# Patient Record
Sex: FEMALE | Race: WHITE | NOT HISPANIC OR LATINO | Employment: UNEMPLOYED | URBAN - NONMETROPOLITAN AREA
[De-identification: names, ages, dates, MRNs, and addresses within clinical notes are randomized per-mention and may not be internally consistent; named-entity substitution may affect disease eponyms.]

---

## 2017-04-15 DIAGNOSIS — E03.9 ACQUIRED HYPOTHYROIDISM: ICD-10-CM

## 2017-04-17 RX ORDER — LEVOTHYROXINE SODIUM 112 UG/1
TABLET ORAL
Qty: 90 TABLET | Refills: 0 | Status: SHIPPED | OUTPATIENT
Start: 2017-04-17 | End: 2017-11-06 | Stop reason: SDUPTHER

## 2017-11-06 ENCOUNTER — OFFICE VISIT (OUTPATIENT)
Dept: FAMILY MEDICINE CLINIC | Facility: CLINIC | Age: 52
End: 2017-11-06

## 2017-11-06 VITALS
RESPIRATION RATE: 16 BRPM | SYSTOLIC BLOOD PRESSURE: 127 MMHG | HEART RATE: 70 BPM | TEMPERATURE: 97.9 F | WEIGHT: 233 LBS | BODY MASS INDEX: 36.57 KG/M2 | OXYGEN SATURATION: 100 % | DIASTOLIC BLOOD PRESSURE: 79 MMHG | HEIGHT: 67 IN

## 2017-11-06 DIAGNOSIS — G47.33 OSA (OBSTRUCTIVE SLEEP APNEA): ICD-10-CM

## 2017-11-06 DIAGNOSIS — Z00.00 ROUTINE GENERAL MEDICAL EXAMINATION AT A HEALTH CARE FACILITY: ICD-10-CM

## 2017-11-06 DIAGNOSIS — E03.9 ACQUIRED HYPOTHYROIDISM: Primary | ICD-10-CM

## 2017-11-06 DIAGNOSIS — Z23 NEED FOR IMMUNIZATION AGAINST INFLUENZA: ICD-10-CM

## 2017-11-06 DIAGNOSIS — Z12.31 SCREENING MAMMOGRAM, ENCOUNTER FOR: ICD-10-CM

## 2017-11-06 DIAGNOSIS — E55.9 VITAMIN D DEFICIENCY: ICD-10-CM

## 2017-11-06 PROCEDURE — 99214 OFFICE O/P EST MOD 30 MIN: CPT | Performed by: INTERNAL MEDICINE

## 2017-11-06 PROCEDURE — 90686 IIV4 VACC NO PRSV 0.5 ML IM: CPT | Performed by: INTERNAL MEDICINE

## 2017-11-06 PROCEDURE — 90471 IMMUNIZATION ADMIN: CPT | Performed by: INTERNAL MEDICINE

## 2017-11-06 RX ORDER — LEVOTHYROXINE SODIUM 0.12 MG/1
125 TABLET ORAL DAILY
Qty: 90 TABLET | Refills: 3 | Status: SHIPPED | OUTPATIENT
Start: 2017-11-06 | End: 2018-09-26 | Stop reason: SDUPTHER

## 2017-11-06 NOTE — PROGRESS NOTES
"Subjective     Patient ID: Caity Oh is a 52 y.o. female. Patient is here for management of multiple medical problems.     Chief Complaint   Patient presents with   • Hypothyroidism     Initial visit to establish care with Dr. Rodriguez, transfer from Dr. Salazar   • medication refills     patient needs refills on Omeprazole and Levothyroxine, patient has not had labs sind 2016 and would like to get a 30 day supply of Levothyroxine until she gets lab results   • Mammogran order     patient states her mammogran is due, she would like to get an order.     Hypothyroidism   This is a chronic problem. The current episode started more than 1 year ago. The problem occurs constantly. The problem has been unchanged. Pertinent negatives include no abdominal pain, anorexia or chest pain. She has tried nothing for the symptoms. The treatment provided no relief.    out of meds that past week.     pcp dvers. Needs medical management.    The following portions of the patient's history were reviewed and updated as appropriate: allergies, current medications, past family history, past medical history, past social history, past surgical history and problem list.    Review of Systems   Cardiovascular: Negative for chest pain.   Gastrointestinal: Negative for abdominal pain and anorexia.       Current Outpatient Prescriptions:   •  Cholecalciferol (VITAMIN D) 2000 UNITS capsule, Take 1 capsule by mouth daily., Disp: , Rfl:   •  levothyroxine (SYNTHROID, LEVOTHROID) 125 MCG tablet, Take 1 tablet by mouth Daily., Disp: 90 tablet, Rfl: 3  •  omeprazole (PriLOSEC) 40 MG capsule, Take 1 capsule by mouth Daily., Disp: 90 capsule, Rfl: 3    Objective      Blood pressure 127/79, pulse 70, temperature 97.9 °F (36.6 °C), temperature source Temporal Artery , resp. rate 16, height 67\" (170.2 cm), weight 233 lb (106 kg), SpO2 100 %.    Physical Exam     General Appearance:    Alert, cooperative, no distress, appears stated age   Head:    " Normocephalic, without obvious abnormality, atraumatic   Eyes:    PERRL, conjunctiva/corneas clear, EOM's intact   Ears:    Normal TM's and external ear canals, both ears   Nose:   Nares normal, septum midline, mucosa normal, no drainage   or sinus tenderness   Throat:   Lips, mucosa, and tongue normal; teeth and gums normal   Neck:   Supple, symmetrical, trachea midline, no adenopathy;        thyroid:  No enlargement/tenderness/nodules; no carotid    bruit or JVD   Back:     Symmetric, no curvature, ROM normal, no CVA tenderness   Lungs:     Clear to auscultation bilaterally, respirations unlabored   Chest wall:    No tenderness or deformity   Heart:    Regular rate and rhythm, S1 and S2 normal, no murmur,        rub or gallop   Abdomen:     Soft, non-tender, bowel sounds active all four quadrants,     no masses, no organomegaly   Extremities:   Extremities normal, atraumatic, no cyanosis or edema   Pulses:   2+ and symmetric all extremities   Skin:   Skin color, texture, turgor normal, no rashes or lesions   Lymph nodes:   Cervical, supraclavicular, and axillary nodes normal   Neurologic:   CNII-XII intact. Normal strength, sensation and reflexes       throughout      Results for orders placed or performed in visit on 07/06/16   TSH   Result Value Ref Range    TSH 5.364 (H) 0.350 - 5.350 mIU/mL   T4, free   Result Value Ref Range    Free T4 0.95 0.89 - 1.76 ng/dL         Assessment/Plan   Pt with mild venkatesh. cpap not tolerated nasal. Never tried mask.       Caity was seen today for hypothyroidism, medication refills and mammogran order.    Diagnoses and all orders for this visit:    Acquired hypothyroidism  -     Lipid Panel  -     TSH  -     T4, Free  -     Vitamin B12  -     Comprehensive Metabolic Panel  -     CBC & Differential  -     Lipid Panel  -     levothyroxine (SYNTHROID, LEVOTHROID) 125 MCG tablet; Take 1 tablet by mouth Daily.    Vitamin D deficiency  -     Lipid Panel  -     TSH  -     T4, Free  -      Vitamin B12  -     Comprehensive Metabolic Panel  -     CBC & Differential  -     Lipid Panel    JORI (obstructive sleep apnea)  -     Ambulatory Referral to Neurology    Routine general medical examination at a health care facility  -     Ambulatory Referral to Gastroenterology    Screening mammogram, encounter for  -     Mammo Screening Bilateral With CAD; Future      Return in about 4 months (around 3/6/2018).          There are no Patient Instructions on file for this visit.     Artem Rodriguez MD    Assessment/Plan       Set up for screening colonoscopy.      Caity was seen today for hypothyroidism, medication refills and mammogran order.    Diagnoses and all orders for this visit:    Acquired hypothyroidism  -     Lipid Panel  -     TSH  -     T4, Free  -     Vitamin B12  -     Comprehensive Metabolic Panel  -     CBC & Differential  -     Lipid Panel  -     levothyroxine (SYNTHROID, LEVOTHROID) 125 MCG tablet; Take 1 tablet by mouth Daily.    Vitamin D deficiency  -     Lipid Panel  -     TSH  -     T4, Free  -     Vitamin B12  -     Comprehensive Metabolic Panel  -     CBC & Differential  -     Lipid Panel    JORI (obstructive sleep apnea)  -     Ambulatory Referral to Neurology    Routine general medical examination at a health care facility  -     Ambulatory Referral to Gastroenterology    Screening mammogram, encounter for  -     Mammo Screening Bilateral With CAD; Future      Return in about 4 months (around 3/6/2018).                   There are no Patient Instructions on file for this visit.

## 2017-12-06 ENCOUNTER — HOSPITAL ENCOUNTER (OUTPATIENT)
Dept: MAMMOGRAPHY | Facility: HOSPITAL | Age: 52
Discharge: HOME | End: 2017-12-06
Attending: INTERNAL MEDICINE | Admitting: INTERNAL MEDICINE

## 2017-12-06 DIAGNOSIS — Z12.31 SCREENING MAMMOGRAM, ENCOUNTER FOR: ICD-10-CM

## 2017-12-06 PROCEDURE — G0202 SCR MAMMO BI INCL CAD: HCPCS

## 2017-12-06 PROCEDURE — 77063 BREAST TOMOSYNTHESIS BI: CPT

## 2018-02-22 ENCOUNTER — OFFICE VISIT (OUTPATIENT)
Dept: NEUROLOGY | Facility: CLINIC | Age: 53
End: 2018-02-22

## 2018-02-22 VITALS
WEIGHT: 233 LBS | OXYGEN SATURATION: 99 % | DIASTOLIC BLOOD PRESSURE: 80 MMHG | BODY MASS INDEX: 36.57 KG/M2 | HEART RATE: 79 BPM | SYSTOLIC BLOOD PRESSURE: 126 MMHG | HEIGHT: 67 IN

## 2018-02-22 DIAGNOSIS — G47.34 NOCTURNAL OXYGEN DESATURATION: ICD-10-CM

## 2018-02-22 DIAGNOSIS — G25.81 RESTLESS LEGS SYNDROME (RLS): ICD-10-CM

## 2018-02-22 DIAGNOSIS — G47.61 PERIODIC LIMB MOVEMENT DISORDER (PLMD): ICD-10-CM

## 2018-02-22 DIAGNOSIS — G47.19 EXCESSIVE DAYTIME SLEEPINESS: ICD-10-CM

## 2018-02-22 DIAGNOSIS — G47.33 OBSTRUCTIVE SLEEP APNEA: Primary | ICD-10-CM

## 2018-02-22 PROCEDURE — 99244 OFF/OP CNSLTJ NEW/EST MOD 40: CPT | Performed by: PSYCHIATRY & NEUROLOGY

## 2018-02-22 NOTE — PROGRESS NOTES
"NEUROLOGY CONSULTATION   History of Present Illness     Date: 2/22/2018    Patient Identification  Caity Oh is a 52 y.o. female.    Patient information was obtained from patient.  History/Exam limitations: none.  Patient presented to the [facility] by private vehicle.  Referring Provider: Artme Rodriguez MD      Chief Complaint   Memory Loss (NP, in office today for consult. Pt c/o forgetfullness, \"feeling foggoy\", being light sleeper. Pt had sleep study in 2014, dx'ed with \"mild\" JORI. Pt states she rx'ed CPAP machine, was unable to have initial follow up after starting machine )      EPWORTH SLEEPINESS SCALE   6    History of Present Illness:   Pt is a 51 y/o with a PMH significant for hypothyroidism and GERD that presents today for difficulty sleeping. First sleep study was completed in 2014 following presentation to her PCP with difficulty concentrating, memory problems, and increased evening time sleepiness. She was given a CPAP at that time but was not comfortable with the mask. She reports early afternoon sleepiness and multiple episodes of drowsiness over the past two years.     PMH:   Past Medical History:   Diagnosis Date   • Acid reflux disease 9/30/2016   • Arthritis    • Fracture of toe, closed    • Fractures    • Hypothyroidism 7/6/2016   • Knee injury    • Right knee injury    • Sleep apnea        Family Hisotry:   Family History   Problem Relation Age of Onset   • Other Father      malignant neoplasm   • Prostate cancer Father    • Arthritis Sister    • COPD Mother    • Dementia Maternal Grandmother    • Stroke Maternal Grandmother    • Stomach cancer Maternal Grandfather    • Cancer Paternal Grandfather        Social Hisotry:   Social History     Social History   • Marital status:      Spouse name: N/A   • Number of children: N/A   • Years of education: N/A     Occupational History   • Not on file.     Social History Main Topics   • Smoking status: Never " Smoker   • Smokeless tobacco: Never Used   • Alcohol use Yes      Comment: occasional alcohol use   • Drug use: No   • Sexual activity: Not on file     Other Topics Concern   • Not on file     Social History Narrative       Medications:   Current Outpatient Prescriptions   Medication Sig Dispense Refill   • levothyroxine (SYNTHROID, LEVOTHROID) 125 MCG tablet Take 1 tablet by mouth Daily. 90 tablet 3   • omeprazole (PriLOSEC) 40 MG capsule Take 1 capsule by mouth Daily. 90 capsule 3     No current facility-administered medications for this visit.        Allergy: No Known Allergies    Review of Systems:The following portions of the patient's history were reviewed and updated as appropriate: allergies, current medications, past family history, past medical history, past social history, past surgical history and problem list.  Review of Systems   Constitutional: Positive for fatigue. Negative for chills and fever.   HENT: Negative for congestion, ear pain, hearing loss, rhinorrhea and sore throat.    Eyes: Negative for pain, discharge and redness.   Respiratory: Positive for apnea. Negative for cough, shortness of breath, wheezing and stridor.    Cardiovascular: Negative for chest pain, palpitations and leg swelling.   Gastrointestinal: Negative for abdominal pain, constipation, nausea and vomiting.   Endocrine: Negative for cold intolerance, heat intolerance and polyphagia.   Genitourinary: Negative for dysuria, flank pain, frequency and urgency.   Musculoskeletal: Negative for joint swelling, myalgias, neck pain and neck stiffness.   Skin: Negative for pallor, rash and wound.   Allergic/Immunologic: Negative for environmental allergies.   Neurological: Negative for dizziness, tremors, seizures, syncope, facial asymmetry, speech difficulty, weakness, light-headedness, numbness and headaches.   Hematological: Negative for adenopathy.   Psychiatric/Behavioral: Positive for decreased concentration and sleep disturbance.  "Negative for confusion and hallucinations. The patient is not nervous/anxious.          Physical Exam     /80  Pulse 79  Ht 170.2 cm (67\")  Wt 106 kg (233 lb)  SpO2 99%  BMI 36.49 kg/m2  GENERAL: Patient is pleasant, cooperative, appears to be stated age.  Body habitus is endomorphic.  SKIN AND EXTREMITIES:  No skin rashes or lesions are noted.  No cyanosis, clubbing or edema of the extremities.    HEAD:  Head is normocephalic and atraumatic.    NECK: Neck are non-tender without thyromegaly or adenopathy.  Carotic upstrokes are 1+/4.  No cranial or cervical bruits.  The neck is supple with a full range of motion.   ENT: palate elevate symmetrically, no evidence of high arch palate, tongue midline erythema in posterior pharynx, MellamPatti Classification Class III   HEART:  Regular rate and rhythm with normal S1 and S2 without rub or gallop  LUNGS:  Clear to auscultation without wheezes or crackle   ABDOMEN:  Soft and non-tender, positive bowel sound without hepatosplenomegaly  BACK:  Back is straight without midline defect.    MENTAL:  Higher cortical function/mental status:  The patient is alert.  She is oriented x3 to time, place and person.  Recent and the remote memory appear normal.  The patient has a good fund of knowledge.  There is no visual or auditory hallucination or suicidal or homicidal ideation.  SPEECH:There is no gross evidence of aphasia, dysarthria or agnosia.      CRANIAL NERVES:  Pupils are 4mm, equal round reactive to light, reacting briskly to 2mm without afferent pupillary defect.  Visual fields are intact to confrontation testing.  Fundoscopic examination reveals sharp disk margins with normal vasculature.  No papilledema, hemorrhages or exudates.  Extraocular movements are full and smooth with normal pursuits and saccades.  No nystagmus noted.  The face is symmetric. palate elevate symmetrically, Tongue midline, positive gag reflex. The remainder of the cranial nerves are intact " and symmetrical.    MOTOR: Strength is 5/5 throughout with normal tone and bulk with the following exceptions, 4/5 intrinsic muscles of the hands and feet.  No involuntary movements noted.    REFLEX: are 2/4 and symmetrical in the upper extremities, 2/4 and symmetrical at the knees and 1/4 and symmetrical at the Achilles tendon.  Plantar responses were down-going bilaterally.    SENSATION:  Intact to pinprick, light touch, vibration and proprioception.  Coordination:  The patient normally performs finger-nose-finger, heel-to-knee-to-shin and rapid alternating movements in symmetrical fashion.    STATION AND GAIT:  The patient walks with a narrow-based gait.  Patient is able to heel-toe and tandem walk forward and backwards without difficulty.  Romberg and monopedal  Romberg are negative.             Records Reviewed: Old medical records.    Caity was seen today for memory loss.    Diagnoses and all orders for this visit:    Obstructive sleep apnea  -     Polysomnography 4 or More Parameters; Future    Excessive daytime sleepiness  -     Polysomnography 4 or More Parameters; Future    Nocturnal oxygen desaturation  -     Polysomnography 4 or More Parameters; Future    Restless legs syndrome (RLS)    Periodic limb movement disorder (PLMD)        Treatments:  1. Diagnostic sleep study to evaluate for obstructive sleep apnea.    Discussion:  Sleep Apnea  I have counseled patient extensively on Sleep Hygiene including regular sleep wake schedule and stimulus control therapy.  I have also discussed the importance of weight reduction because 10% reduction in body weight can reduce sleep apnea by 50 %. We have also discussed abstaining from smoking and drinking.  I have explained to patient that obstructive apnea episode is defined as the absence of airflow for at least 10 seconds.  Sleep apnea is usually accompanied by snoring, disturbed sleep, and daytime sleepiness. Patients with micrognathia, retrognathia, enlarged  tonsils, tongue enlargement, and acromegaly are especially predisposed to obstructive sleep apnea. Abnormalities or weakness in the muscles can also contribute to obstructive sleep apnea. Obesity can also contribute to sleep apnea.     Sleep apnea can lead to a number of complications, ranging from daytime sleepiness to possible increased risk of cardiovascular risks.   Daytime sleepiness is the most serious.  Daytime sleepiness can also increase the risk for accident-related injuries. Several studies have suggested that people with sleep apnea have two to three times as many car accidents, and five to seven times the risk for multiple accidents.   A number of cardiovascular diseases -- including high blood pressure, heart failure, stroke, and heart arrhythmias -- have an association with obstructive sleep apnea.   Up to a third of patients with heart failure also have sleep apnea. Both central and obstructive sleep apnea are linked with heart failure. Obstructive sleep apnea is also noted to be associated with type 2 diabetes according to Dr. Gomez at [facility].  The best treatment for symptomatic obstructive sleep apnea is continuous positive airflow pressure (CPAP). Bilevel positive airway pressure (BPAP) systems may be particularly helpful for patients with coexisting lung disease and those with excessive levels of carbon dioxide.  Other treatment options including UPPP surgery, LAUP surgery, radiofrequency somnoplasty and dental appliances such Romelia or Clearway.    Restless Legs Syndrome  I have counseled patient extensively on the pathophysiology of Restless Legs Syndrome(RLS).  I have explained to the patient how D3 receptor dysfunction can give rise to RLS.  We have also discussed how iron deficiency can contribute to RLS.   Nevertheless, the first line of defense against restless legs syndrome is to avoid substances or foods that may be causing or worsening the problem such as alcohol, caffeine , and  nicotine. In addition, we have reviewed all medications could exacerbate the problem and checking Ferritin level today.      I have also reviewed with my patient any underlying medical conditions that could give rise to secondary RLS, such as anemia , diabetes , nutritional deficiencies, kidney disease, thyroid  disease, varicose veins, or Parkinson's disease.     We have discussed FDA approved pharmacologic intervention such as dopamine agonists.  These are most often the first line treatment used to treat RLS including Mirapex (pramipexole), Neupro patch (rotigotine), and Requip (ropinirole). I have also counseled patient on side effects of Dopamine agonists include daytime sleepiness, nausea, hypotension and lightheadedness.    We have also discussed the use of non-dopaminergic medication such as Horizant (gabapentin enacarbil), as an adjunct to relieve the symptoms of RLS especially painful RLS symptoms.      This document is signed by Ward Ceo MD, FAAN, FAASM February 22, 2018 1:19 PM

## 2018-04-10 ENCOUNTER — APPOINTMENT (OUTPATIENT)
Dept: SLEEP MEDICINE | Facility: HOSPITAL | Age: 53
End: 2018-04-10
Attending: PSYCHIATRY & NEUROLOGY

## 2018-09-18 ENCOUNTER — OFFICE VISIT (OUTPATIENT)
Dept: INTERNAL MEDICINE | Facility: CLINIC | Age: 53
End: 2018-09-18

## 2018-09-18 ENCOUNTER — TELEPHONE (OUTPATIENT)
Dept: SURGERY | Facility: CLINIC | Age: 53
End: 2018-09-18

## 2018-09-18 VITALS
RESPIRATION RATE: 16 BRPM | HEART RATE: 68 BPM | TEMPERATURE: 98 F | OXYGEN SATURATION: 100 % | HEIGHT: 67 IN | DIASTOLIC BLOOD PRESSURE: 76 MMHG | BODY MASS INDEX: 36.26 KG/M2 | WEIGHT: 231 LBS | SYSTOLIC BLOOD PRESSURE: 130 MMHG

## 2018-09-18 DIAGNOSIS — G89.29 CHRONIC LEFT SHOULDER PAIN: ICD-10-CM

## 2018-09-18 DIAGNOSIS — M19.90 ARTHRITIS: ICD-10-CM

## 2018-09-18 DIAGNOSIS — S76.012A STRAIN OF LEFT PSOAS MUSCLE, INITIAL ENCOUNTER: ICD-10-CM

## 2018-09-18 DIAGNOSIS — M54.50 CHRONIC BILATERAL LOW BACK PAIN WITHOUT SCIATICA: ICD-10-CM

## 2018-09-18 DIAGNOSIS — M25.552 PAIN OF LEFT HIP JOINT: ICD-10-CM

## 2018-09-18 DIAGNOSIS — Z12.11 COLON CANCER SCREENING: Primary | ICD-10-CM

## 2018-09-18 DIAGNOSIS — E03.9 ACQUIRED HYPOTHYROIDISM: ICD-10-CM

## 2018-09-18 DIAGNOSIS — M25.512 CHRONIC LEFT SHOULDER PAIN: ICD-10-CM

## 2018-09-18 DIAGNOSIS — R53.83 FATIGUE, UNSPECIFIED TYPE: ICD-10-CM

## 2018-09-18 DIAGNOSIS — R79.89 LOW VITAMIN D LEVEL: ICD-10-CM

## 2018-09-18 DIAGNOSIS — G89.29 CHRONIC BILATERAL LOW BACK PAIN WITHOUT SCIATICA: ICD-10-CM

## 2018-09-18 PROCEDURE — 99214 OFFICE O/P EST MOD 30 MIN: CPT | Performed by: INTERNAL MEDICINE

## 2018-09-18 NOTE — PROGRESS NOTES
Subjective     Patient ID: Caity Oh is a 53 y.o. female. Patient is here for management of multiple medical problems.     Chief Complaint   Patient presents with   • Hypothyroidism     follow-up, needs lab orders   • Pain     patient having pain in the left hip and left shoulder x 6 months, patient states the pain comes when she moves or walks and it is getting worse    • Medication refills     patient needs refills on Levothyroxine      Hypothyroidism   This is a chronic problem. The current episode started today. The problem has been resolved. Associated symptoms include arthralgias, fatigue and joint swelling. Pertinent negatives include no chest pain or congestion. Nothing aggravates the symptoms. The treatment provided significant relief.   Pain   Associated symptoms include arthralgias, fatigue and joint swelling. Pertinent negatives include no chest pain or congestion.    lives in [place].    No recent tick bites and no known tick bites. Lots of ticks taken off her last year.      Pain in left hip and shoulder x 6 moth. Worse with walking and moving.  Taking advil.    Fell over a year ago.  Sleep on left side and sleeps with hands over head if on back.  Stiff in lower back.    Mentally foggy.            The following portions of the patient's history were reviewed and updated as appropriate: allergies, current medications, past family history, past medical history, past social history, past surgical history and problem list.    Review of Systems   Constitutional: Positive for fatigue.   HENT: Negative for congestion and dental problem.    Respiratory: Negative for shortness of breath and stridor.    Cardiovascular: Negative for chest pain and leg swelling.   Gastrointestinal: Negative for abdominal distention.   Musculoskeletal: Positive for arthralgias, back pain, gait problem and joint swelling.   Psychiatric/Behavioral: Negative for sleep disturbance.   All other systems reviewed and are  "negative.      Current Outpatient Prescriptions:   •  levothyroxine (SYNTHROID, LEVOTHROID) 125 MCG tablet, Take 1 tablet by mouth Daily., Disp: 90 tablet, Rfl: 3  •  omeprazole (PriLOSEC) 40 MG capsule, Take 1 capsule by mouth Daily., Disp: 90 capsule, Rfl: 3    Objective      Blood pressure 130/76, pulse 68, temperature 98 °F (36.7 °C), temperature source Oral, resp. rate 16, height 170.2 cm (67\"), weight 105 kg (231 lb), SpO2 100 %.    Physical Exam     General Appearance:    Alert, cooperative, no distress, appears stated age   Head:    Normocephalic, without obvious abnormality, atraumatic   Eyes:    PERRL, conjunctiva/corneas clear, EOM's intact   Ears:    Normal TM's and external ear canals, both ears   Nose:   Nares normal, septum midline, mucosa normal, no drainage   or sinus tenderness   Throat:   Lips, mucosa, and tongue normal; teeth and gums normal   Neck:   Supple, symmetrical, trachea midline, no adenopathy;        thyroid:  No enlargement/tenderness/nodules; no carotid    bruit or JVD   Back:     Symmetric, no curvature, ROM normal, no CVA tenderness   Lungs:     Clear to auscultation bilaterally, respirations unlabored   Chest wall:    No tenderness or deformity   Heart:    Regular rate and rhythm, S1 and S2 normal, no murmur,        rub or gallop   Abdomen:     Soft, non-tender, bowel sounds active all four quadrants,     no masses, no organomegaly   Extremities:   ttp left deltoid burasa.  Troch left pain ttp. ttp left groind.      Pulses:   2+ and symmetric all extremities   Skin:   Skin color, texture, turgor normal, no rashes or lesions   Lymph nodes:   Cervical, supraclavicular, and axillary nodes normal   Neurologic:   CNII-XII intact. Normal strength, sensation and reflexes       throughout      Results for orders placed or performed in visit on 07/06/16   TSH   Result Value Ref Range    TSH 5.364 (H) 0.350 - 5.350 mIU/mL   T4, free   Result Value Ref Range    Free T4 0.95 0.89 - 1.76 ng/dL "         Assessment/Plan     PT to stretch psoas on left hip.  Pain gel for deltoid bursitis and troch bursitis.  rx sent to [place].           Caity was seen today for hypothyroidism, pain and medication refills.    Diagnoses and all orders for this visit:    Colon cancer screening  -     Ambulatory Referral to General Surgery    Low vitamin D level  -     Vitamin D 25 Hydroxy    Arthritis  -     [place] Spotted Fever, IgM  -     [place] Spotted Fever, IgG  -     Lyme Disease, PCR  -     Ehrlichia Chaffeensis PCR    Fatigue, unspecified type  -     [place] Spotted Fever, IgM  -     [place] Spotted Fever, IgG  -     Lyme Disease, PCR  -     Ehrlichia Chaffeensis PCR  -     Comprehensive Metabolic Panel  -     Vitamin B12  -     CBC & Differential  -     Lipid Panel    Pain of left hip joint  -     [place] Spotted Fever, IgM  -     [place] Spotted Fever, IgG  -     Lyme Disease, PCR  -     Ehrlichia Chaffeensis PCR    Chronic bilateral low back pain without sciatica  -     [place] Spotted Fever, IgM  -     [place] Spotted Fever, IgG  -     Ehrlichia Chaffeensis PCR    Chronic left shoulder pain  -     [place] Spotted Fever, IgM  -     [place] Spotted Fever, IgG  -     Ehrlichia Chaffeensis PCR    Acquired hypothyroidism  -     T4, Free  -     TSH    Strain of left psoas muscle, initial encounter      Return in about 4 weeks (around 10/16/2018).          There are no Patient Instructions on file for this visit.     Artem Rodriguez MD    Assessment/Plan

## 2018-09-19 RX ORDER — BISACODYL 5 MG/1
5 TABLET, DELAYED RELEASE ORAL DAILY
Qty: 4 TABLET | Refills: 0 | Status: SHIPPED | OUTPATIENT
Start: 2018-09-19

## 2018-09-19 RX ORDER — POLYETHYLENE GLYCOL 3350 17 G/17G
238 POWDER, FOR SOLUTION ORAL ONCE
Qty: 14 PACKET | Refills: 0 | Status: SHIPPED | OUTPATIENT
Start: 2018-09-19 | End: 2018-09-19

## 2018-09-19 NOTE — TELEPHONE ENCOUNTER
[place] [facility] [place].   Never had a colonoscopy.  Instructions mailed, prep sent in

## 2018-09-20 ENCOUNTER — PREP FOR SURGERY (OUTPATIENT)
Dept: OTHER | Facility: HOSPITAL | Age: 53
End: 2018-09-20

## 2018-09-20 DIAGNOSIS — Z12.11 ENCOUNTER FOR SCREENING COLONOSCOPY: Primary | ICD-10-CM

## 2018-09-21 ENCOUNTER — TELEPHONE (OUTPATIENT)
Dept: INTERNAL MEDICINE | Facility: CLINIC | Age: 53
End: 2018-09-21

## 2018-09-21 NOTE — TELEPHONE ENCOUNTER
Dr. Rodriguez, patient saw labs in [facility], she needs refills for Thyroid, and she has questions about Vitamin D, she would like to talk to you.

## 2018-09-24 LAB
25(OH)D3+25(OH)D2 SERPL-MCNC: 25.3 NG/ML
ALBUMIN SERPL-MCNC: 4.2 G/DL (ref 3.5–5)
ALBUMIN/GLOB SERPL: 1.6 G/DL (ref 1–2)
ALP SERPL-CCNC: 114 U/L (ref 38–126)
ALT SERPL-CCNC: 42 U/L (ref 13–69)
AST SERPL-CCNC: 28 U/L (ref 15–46)
B BURGDOR DNA SPEC QL NAA+PROBE: NEGATIVE
BASOPHILS # BLD AUTO: 0.05 10*3/MM3 (ref 0–0.2)
BASOPHILS NFR BLD AUTO: 0.9 % (ref 0–2.5)
BILIRUB SERPL-MCNC: 0.5 MG/DL (ref 0.2–1.3)
BUN SERPL-MCNC: 17 MG/DL (ref 7–20)
BUN/CREAT SERPL: 28.3 (ref 7.1–23.5)
CALCIUM SERPL-MCNC: 9.6 MG/DL (ref 8.4–10.2)
CHLORIDE SERPL-SCNC: 107 MMOL/L (ref 98–107)
CHOLEST SERPL-MCNC: 182 MG/DL (ref 0–199)
CO2 SERPL-SCNC: 26 MMOL/L (ref 26–30)
CREAT SERPL-MCNC: 0.6 MG/DL (ref 0.6–1.3)
E CHAFFEENSIS DNA BLD QL NAA+PROBE: NEGATIVE
EOSINOPHIL # BLD AUTO: 0.42 10*3/MM3 (ref 0–0.7)
EOSINOPHIL NFR BLD AUTO: 7.8 % (ref 0–7)
ERYTHROCYTE [DISTWIDTH] IN BLOOD BY AUTOMATED COUNT: 12.3 % (ref 11.5–14.5)
GLOBULIN SER CALC-MCNC: 2.7 GM/DL
GLUCOSE SERPL-MCNC: 101 MG/DL (ref 74–98)
HCT VFR BLD AUTO: 43.4 % (ref 37–47)
HDLC SERPL-MCNC: 76 MG/DL (ref 40–60)
HGB BLD-MCNC: 14.3 G/DL (ref 12–16)
IMM GRANULOCYTES # BLD: 0.01 10*3/MM3 (ref 0–0.06)
IMM GRANULOCYTES NFR BLD: 0.2 % (ref 0–0.6)
LDLC SERPL CALC-MCNC: 82 MG/DL (ref 0–99)
LYMPHOCYTES # BLD AUTO: 1.71 10*3/MM3 (ref 0.6–3.4)
LYMPHOCYTES NFR BLD AUTO: 31.8 % (ref 10–50)
MCH RBC QN AUTO: 29.5 PG (ref 27–31)
MCHC RBC AUTO-ENTMCNC: 32.9 G/DL (ref 30–37)
MCV RBC AUTO: 89.7 FL (ref 81–99)
MONOCYTES # BLD AUTO: 0.41 10*3/MM3 (ref 0–0.9)
MONOCYTES NFR BLD AUTO: 7.6 % (ref 0–12)
NEUTROPHILS # BLD AUTO: 2.78 10*3/MM3 (ref 2–6.9)
NEUTROPHILS NFR BLD AUTO: 51.7 % (ref 37–80)
NRBC BLD AUTO-RTO: 0 /100 WBC (ref 0–0)
PLATELET # BLD AUTO: 280 10*3/MM3 (ref 130–400)
POTASSIUM SERPL-SCNC: 4.4 MMOL/L (ref 3.5–5.1)
PROT SERPL-MCNC: 6.9 G/DL (ref 6.3–8.2)
R RICKETTSI IGG SER QL IA: NEGATIVE
R RICKETTSI IGM SER-ACNC: 0.27 INDEX (ref 0–0.89)
RBC # BLD AUTO: 4.84 10*6/MM3 (ref 4.2–5.4)
SODIUM SERPL-SCNC: 140 MMOL/L (ref 137–145)
T4 FREE SERPL-MCNC: 0.8 NG/DL (ref 0.78–2.19)
TRIGL SERPL-MCNC: 121 MG/DL
TSH SERPL DL<=0.005 MIU/L-ACNC: 0.06 MIU/ML (ref 0.47–4.68)
VIT B12 SERPL-MCNC: 320 PG/ML (ref 239–931)
VLDLC SERPL CALC-MCNC: 24.2 MG/DL
WBC # BLD AUTO: 5.38 10*3/MM3 (ref 4.8–10.8)

## 2018-09-25 ENCOUNTER — PREP FOR SURGERY (OUTPATIENT)
Dept: OTHER | Facility: HOSPITAL | Age: 53
End: 2018-09-25

## 2018-09-25 DIAGNOSIS — Z12.11 ENCOUNTER FOR SCREENING COLONOSCOPY: Primary | ICD-10-CM

## 2018-09-26 DIAGNOSIS — E53.8 VITAMIN B 12 DEFICIENCY: Primary | ICD-10-CM

## 2018-09-26 DIAGNOSIS — M25.552 LEFT HIP PAIN: ICD-10-CM

## 2018-09-26 DIAGNOSIS — E03.9 ACQUIRED HYPOTHYROIDISM: ICD-10-CM

## 2018-09-26 RX ORDER — LEVOTHYROXINE SODIUM 0.12 MG/1
125 TABLET ORAL DAILY
Qty: 90 TABLET | Refills: 3 | Status: SHIPPED | OUTPATIENT
Start: 2018-09-26 | End: 2020-05-22 | Stop reason: SDUPTHER

## 2018-10-12 ENCOUNTER — OFFICE VISIT (OUTPATIENT)
Dept: INTERNAL MEDICINE | Facility: CLINIC | Age: 53
End: 2018-10-12

## 2018-10-12 ENCOUNTER — HOSPITAL ENCOUNTER (OUTPATIENT)
Dept: GENERAL RADIOLOGY | Facility: HOSPITAL | Age: 53
Discharge: HOME | End: 2018-10-12
Admitting: INTERNAL MEDICINE

## 2018-10-12 VITALS
TEMPERATURE: 97.9 F | DIASTOLIC BLOOD PRESSURE: 75 MMHG | RESPIRATION RATE: 16 BRPM | SYSTOLIC BLOOD PRESSURE: 141 MMHG | BODY MASS INDEX: 33.9 KG/M2 | HEIGHT: 67 IN | OXYGEN SATURATION: 98 % | HEART RATE: 70 BPM | WEIGHT: 216 LBS

## 2018-10-12 DIAGNOSIS — M25.552 LEFT HIP PAIN: ICD-10-CM

## 2018-10-12 DIAGNOSIS — M79.671 RIGHT FOOT PAIN: Primary | ICD-10-CM

## 2018-10-12 DIAGNOSIS — M70.62 TROCHANTERIC BURSITIS OF LEFT HIP: ICD-10-CM

## 2018-10-12 DIAGNOSIS — M79.671 RIGHT FOOT PAIN: ICD-10-CM

## 2018-10-12 PROCEDURE — 99213 OFFICE O/P EST LOW 20 MIN: CPT | Performed by: INTERNAL MEDICINE

## 2018-10-12 PROCEDURE — 73502 X-RAY EXAM HIP UNI 2-3 VIEWS: CPT

## 2018-10-12 PROCEDURE — 73630 X-RAY EXAM OF FOOT: CPT

## 2018-10-12 NOTE — PROGRESS NOTES
"Subjective     Patient ID: Caity hO is a 53 y.o. female. Patient is here for management of multiple medical problems.     Chief Complaint   Patient presents with   • Foot Swelling     patient here for swelling on the right top of foot, patient states it aches when sitting, but she has pain when walking x 1 week     History of Present Illness     Was on Fall brake and did a lot of walking last week.   Pain started after getting back for vacation.    Taking advil 3 pill before bed      The following portions of the patient's history were reviewed and updated as appropriate: allergies, current medications, past family history, past medical history, past social history, past surgical history and problem list.    Review of Systems   Constitutional: Negative for fatigue.   Musculoskeletal: Positive for arthralgias, gait problem and joint swelling.   All other systems reviewed and are negative.      Current Outpatient Prescriptions:   •  bisacodyl (bisacodyl) 5 MG EC tablet, Take 1 tablet by mouth Daily., Disp: 4 tablet, Rfl: 0  •  levothyroxine (SYNTHROID, LEVOTHROID) 125 MCG tablet, Take 1 tablet by mouth Daily., Disp: 90 tablet, Rfl: 3  •  omeprazole (PriLOSEC) 40 MG capsule, Take 1 capsule by mouth Daily., Disp: 90 capsule, Rfl: 3    Objective      Blood pressure 141/75, pulse 70, temperature 97.9 °F (36.6 °C), temperature source Oral, resp. rate 16, height 170.2 cm (67\"), weight 98 kg (216 lb), SpO2 98 %.    Physical Exam     General Appearance:    Alert, cooperative, no distress, appears stated age   Head:    Normocephalic, without obvious abnormality, atraumatic   Eyes:    PERRL, conjunctiva/corneas clear, EOM's intact   Ears:    Normal TM's and external ear canals, both ears   Nose:   Nares normal, septum midline, mucosa normal, no drainage   or sinus tenderness   Throat:   Lips, mucosa, and tongue normal; teeth and gums normal   Neck:   Supple, symmetrical, trachea midline, no adenopathy;        " thyroid:  No enlargement/tenderness/nodules; no carotid    bruit or JVD   Back:     Symmetric, no curvature, ROM normal, no CVA tenderness   Lungs:     Clear to auscultation bilaterally, respirations unlabored   Chest wall:    No tenderness or deformity   Heart:    Regular rate and rhythm, S1 and S2 normal, no murmur,        rub or gallop   Abdomen:     Soft, non-tender, bowel sounds active all four quadrants,     no masses, no organomegaly   Extremities:   ttp of right foot.       Pulses:   2+ and symmetric all extremities   Skin:   Skin color, texture, turgor normal, no rashes or lesions   Lymph nodes:   Cervical, supraclavicular, and axillary nodes normal   Neurologic:   CNII-XII intact. Normal strength, sensation and reflexes       throughout      Results for orders placed or performed in visit on 09/18/18   [place] Spotted Fever, IgM   Result Value Ref Range    RMSF IgM 0.27 0.00 - 0.89 index   [facility] Spotted Fever, IgG   Result Value Ref Range    RMSF IgG Negative Negative   Lyme Disease, PCR   Result Value Ref Range    Lyme Disease(B.burgdorferi)PCR Negative Negative   Ehrlichia Chaffeensis PCR   Result Value Ref Range    Ehrlichia chaffeensis PCR Negative Negative   T4, Free   Result Value Ref Range    Free T4 0.80 0.78 - 2.19 ng/dL   TSH   Result Value Ref Range    TSH 0.061 (L) 0.470 - 4.680 mIU/mL   Comprehensive Metabolic Panel   Result Value Ref Range    Glucose 101 (H) 74 - 98 mg/dL    BUN 17 7 - 20 mg/dL    Creatinine 0.60 0.60 - 1.30 mg/dL    eGFR Non African Am 105 >60 mL/min/1.73    eGFR African Am 128 >60 mL/min/1.73    BUN/Creatinine Ratio 28.3 (H) 7.1 - 23.5    Sodium 140 137 - 145 mmol/L    Potassium 4.4 3.5 - 5.1 mmol/L    Chloride 107 98 - 107 mmol/L    Total CO2 26.0 26.0 - 30.0 mmol/L    Calcium 9.6 8.4 - 10.2 mg/dL    Total Protein 6.9 6.3 - 8.2 g/dL    Albumin 4.20 3.50 - 5.00 g/dL    Globulin 2.7 gm/dL    A/G Ratio 1.6 1.0 - 2.0 g/dL    Total Bilirubin 0.5 0.2 - 1.3 mg/dL     Alkaline Phosphatase 114 38 - 126 U/L    AST (SGOT) 28 15 - 46 U/L    ALT (SGPT) 42 13 - 69 U/L   Vitamin B12   Result Value Ref Range    Vitamin B-12 320 239 - 931 pg/mL   Lipid Panel   Result Value Ref Range    Total Cholesterol 182 0 - 199 mg/dL    Triglycerides 121 <150 mg/dL    HDL Cholesterol 76 (H) 40 - 60 mg/dL    VLDL Cholesterol 24.2 mg/dL    LDL Cholesterol  82 0 - 99 mg/dL   Vitamin D 25 Hydroxy   Result Value Ref Range    25 Hydroxy, Vitamin D 25.3 ng/ml   CBC & Differential   Result Value Ref Range    WBC 5.38 4.80 - 10.80 10*3/mm3    RBC 4.84 4.20 - 5.40 10*6/mm3    Hemoglobin 14.3 12.0 - 16.0 g/dL    Hematocrit 43.4 37.0 - 47.0 %    MCV 89.7 81.0 - 99.0 fL    MCH 29.5 27.0 - 31.0 pg    MCHC 32.9 30.0 - 37.0 g/dL    RDW 12.3 11.5 - 14.5 %    Platelets 280 130 - 400 10*3/mm3    Neutrophil Rel % 51.7 37.0 - 80.0 %    Lymphocyte Rel % 31.8 10.0 - 50.0 %    Monocyte Rel % 7.6 0.0 - 12.0 %    Eosinophil Rel % 7.8 (H) 0.0 - 7.0 %    Basophil Rel % 0.9 0.0 - 2.5 %    Neutrophils Absolute 2.78 2.00 - 6.90 10*3/mm3    Lymphocytes Absolute 1.71 0.60 - 3.40 10*3/mm3    Monocytes Absolute 0.41 0.00 - 0.90 10*3/mm3    Eosinophils Absolute 0.42 0.00 - 0.70 10*3/mm3    Basophils Absolute 0.05 0.00 - 0.20 10*3/mm3    Immature Granulocyte Rel % 0.2 0.0 - 0.6 %    Immature Grans Absolute 0.01 0.00 - 0.06 10*3/mm3    nRBC 0.0 0.0 - 0.0 /100 WBC         Assessment/Plan       Caity was seen today for foot swelling.    Diagnoses and all orders for this visit:    Right foot pain  -     XR Foot 3+ View Right; Future    Trochanteric bursitis of left hip    consider inj/    Return if symptoms worsen or fail to improve.          There are no Patient Instructions on file for this visit.     Artem Rodriguez MD    Assessment/Plan

## 2018-10-16 ENCOUNTER — TELEPHONE (OUTPATIENT)
Dept: SURGERY | Facility: CLINIC | Age: 53
End: 2018-10-16

## 2018-10-16 NOTE — TELEPHONE ENCOUNTER
Patient called to cancel colonoscopy for tomorrow. Wants to reschedule, her ride fell through for tomorrow.      I spoke to [email or web address] & let her know the patient had called us to cancel.    Please call the pt back to get her rescheduled.  Thanks.

## 2018-10-16 NOTE — TELEPHONE ENCOUNTER
Patient told [facility] she was cancelling colonoscopy for 10-17-18. Please call Cate at [facility].  Thanks.

## 2018-10-17 NOTE — TELEPHONE ENCOUNTER
[place] [facility] [place], [facility] [place].  Please put on our schedule.

## 2018-10-23 ENCOUNTER — PREP FOR SURGERY (OUTPATIENT)
Dept: OTHER | Facility: HOSPITAL | Age: 53
End: 2018-10-23

## 2018-10-23 DIAGNOSIS — Z12.11 ENCOUNTER FOR SCREENING COLONOSCOPY: Primary | ICD-10-CM

## 2018-11-06 ENCOUNTER — TELEPHONE (OUTPATIENT)
Dept: SURGERY | Facility: CLINIC | Age: 53
End: 2018-11-06

## 2018-11-06 NOTE — TELEPHONE ENCOUNTER
Patient called and stated she had a hip injury and would need to cancel her procedure for 11/06/18. She stated she would like to call back at a later date to reschedule.

## 2019-03-11 ENCOUNTER — TRANSCRIBE ORDERS (OUTPATIENT)
Dept: MAMMOGRAPHY | Facility: HOSPITAL | Age: 54
End: 2019-03-11

## 2019-03-11 DIAGNOSIS — Z12.39 BREAST CANCER SCREENING: Primary | ICD-10-CM

## 2019-03-13 ENCOUNTER — HOSPITAL ENCOUNTER (OUTPATIENT)
Dept: MAMMOGRAPHY | Facility: HOSPITAL | Age: 54
Discharge: HOME | End: 2019-03-13
Admitting: INTERNAL MEDICINE

## 2019-03-13 DIAGNOSIS — Z12.39 BREAST CANCER SCREENING: ICD-10-CM

## 2019-03-13 PROCEDURE — 77063 BREAST TOMOSYNTHESIS BI: CPT

## 2019-03-13 PROCEDURE — 77067 SCR MAMMO BI INCL CAD: CPT

## 2019-03-26 ENCOUNTER — TELEPHONE (OUTPATIENT)
Dept: INTERNAL MEDICINE | Facility: CLINIC | Age: 54
End: 2019-03-26

## 2019-03-27 DIAGNOSIS — G89.29 CHRONIC LEFT-SIDED LOW BACK PAIN WITH LEFT-SIDED SCIATICA: ICD-10-CM

## 2019-03-27 DIAGNOSIS — M54.42 CHRONIC LEFT-SIDED LOW BACK PAIN WITH LEFT-SIDED SCIATICA: ICD-10-CM

## 2019-03-27 DIAGNOSIS — M25.552 LEFT HIP PAIN: Primary | ICD-10-CM

## 2020-05-22 ENCOUNTER — OFFICE VISIT (OUTPATIENT)
Dept: INTERNAL MEDICINE | Facility: CLINIC | Age: 55
End: 2020-05-22

## 2020-05-22 ENCOUNTER — RESULTS ENCOUNTER (OUTPATIENT)
Dept: INTERNAL MEDICINE | Facility: CLINIC | Age: 55
End: 2020-05-22

## 2020-05-22 VITALS
HEIGHT: 67 IN | WEIGHT: 232.8 LBS | HEART RATE: 90 BPM | SYSTOLIC BLOOD PRESSURE: 126 MMHG | BODY MASS INDEX: 36.54 KG/M2 | RESPIRATION RATE: 16 BRPM | OXYGEN SATURATION: 99 % | TEMPERATURE: 97.1 F | DIASTOLIC BLOOD PRESSURE: 86 MMHG

## 2020-05-22 DIAGNOSIS — Z12.11 COLON CANCER SCREENING: ICD-10-CM

## 2020-05-22 DIAGNOSIS — Z12.39 BREAST CANCER SCREENING: ICD-10-CM

## 2020-05-22 DIAGNOSIS — Z00.00 ROUTINE GENERAL MEDICAL EXAMINATION AT A HEALTH CARE FACILITY: ICD-10-CM

## 2020-05-22 DIAGNOSIS — E03.9 ACQUIRED HYPOTHYROIDISM: ICD-10-CM

## 2020-05-22 DIAGNOSIS — E55.9 VITAMIN D DEFICIENCY: Primary | ICD-10-CM

## 2020-05-22 PROCEDURE — 99396 PREV VISIT EST AGE 40-64: CPT | Performed by: INTERNAL MEDICINE

## 2020-05-22 RX ORDER — LEVOTHYROXINE SODIUM 0.12 MG/1
125 TABLET ORAL DAILY
Qty: 90 TABLET | Refills: 3 | Status: SHIPPED | OUTPATIENT
Start: 2020-05-22

## 2020-05-22 RX ORDER — LEVOTHYROXINE SODIUM 0.12 MG/1
125 TABLET ORAL DAILY
Qty: 30 TABLET | Refills: 0 | Status: SHIPPED | OUTPATIENT
Start: 2020-05-22 | End: 2020-05-22 | Stop reason: SDUPTHER

## 2020-05-22 NOTE — PROGRESS NOTES
"Subjective     Patient ID: Caity Oh is a 54 y.o. female. Patient is here for management of multiple medical problems.     Chief Complaint   Patient presents with   • Hypothyroidism     follow-up, patient needs refills on Levothyroxine     History of Present Illness     Pt has not been here for a while.   Good energy. Tolerating thyroid meds well.    Wt stable.    Pt off meds x 1-2 months.          The following portions of the patient's history were reviewed and updated as appropriate: allergies, current medications, past family history, past medical history, past social history, past surgical history and problem list.    Review of Systems   Constitutional: Negative for fatigue.   HENT: Negative for congestion, dental problem and drooling.    Respiratory: Negative for cough and shortness of breath.    Genitourinary: Negative for vaginal bleeding.   Musculoskeletal: Negative for joint swelling, myalgias and neck pain.   Psychiatric/Behavioral: Negative for self-injury, sleep disturbance and suicidal ideas. The patient is not nervous/anxious.    All other systems reviewed and are negative.      Current Outpatient Medications:   •  bisacodyl (bisacodyl) 5 MG EC tablet, Take 1 tablet by mouth Daily., Disp: 4 tablet, Rfl: 0  •  levothyroxine (SYNTHROID, LEVOTHROID) 125 MCG tablet, Take 1 tablet by mouth Daily., Disp: 30 tablet, Rfl: 0  •  omeprazole (PriLOSEC) 40 MG capsule, Take 1 capsule by mouth Daily., Disp: 90 capsule, Rfl: 3    Objective      Blood pressure 126/86, pulse 90, temperature 97.1 °F (36.2 °C), temperature source Temporal, resp. rate 16, height 170.2 cm (67\"), weight 106 kg (232 lb 12.8 oz), SpO2 99 %.    Physical Exam     General Appearance:    Alert, cooperative, no distress, appears stated age   Head:    Normocephalic, without obvious abnormality, atraumatic   Eyes:    PERRL, conjunctiva/corneas clear, EOM's intact   Ears:    Normal TM's and external ear canals, both ears   Nose:   Nares " normal, septum midline, mucosa normal, no drainage   or sinus tenderness   Throat:   Lips, mucosa, and tongue normal; teeth and gums normal   Neck:   Supple, symmetrical, trachea midline, no adenopathy;        thyroid:  No enlargement/tenderness/nodules; no carotid    bruit or JVD   Back:     Symmetric, no curvature, ROM normal, no CVA tenderness   Lungs:     Clear to auscultation bilaterally, respirations unlabored   Chest wall:    No tenderness or deformity   Heart:    Regular rate and rhythm, S1 and S2 normal, no murmur,        rub or gallop   Abdomen:     Soft, non-tender, bowel sounds active all four quadrants,     no masses, no organomegaly   Extremities:   Extremities normal, atraumatic, no cyanosis or edema   Pulses:   2+ and symmetric all extremities   Skin:   Skin color, texture, turgor normal, no rashes or lesions   Lymph nodes:   Cervical, supraclavicular, and axillary nodes normal   Neurologic:   CNII-XII intact. Normal strength, sensation and reflexes       throughout      Results for orders placed or performed in visit on 09/18/18   [place] Spotted Fever, IgM   Result Value Ref Range    RMSF IgM 0.27 0.00 - 0.89 index   [facility] Spotted Fever, IgG   Result Value Ref Range    RMSF IgG Negative Negative   Lyme Disease, PCR   Result Value Ref Range    Lyme Disease(B.burgdorferi)PCR Negative Negative   Ehrlichia Chaffeensis PCR   Result Value Ref Range    Ehrlichia chaffeensis PCR Negative Negative   T4, Free   Result Value Ref Range    Free T4 0.80 0.78 - 2.19 ng/dL   TSH   Result Value Ref Range    TSH 0.061 (L) 0.470 - 4.680 mIU/mL   Comprehensive Metabolic Panel   Result Value Ref Range    Glucose 101 (H) 74 - 98 mg/dL    BUN 17 7 - 20 mg/dL    Creatinine 0.60 0.60 - 1.30 mg/dL    eGFR Non African Am 105 >60 mL/min/1.73    eGFR African Am 128 >60 mL/min/1.73    BUN/Creatinine Ratio 28.3 (H) 7.1 - 23.5    Sodium 140 137 - 145 mmol/L    Potassium 4.4 3.5 - 5.1 mmol/L    Chloride 107 98 - 107  mmol/L    Total CO2 26.0 26.0 - 30.0 mmol/L    Calcium 9.6 8.4 - 10.2 mg/dL    Total Protein 6.9 6.3 - 8.2 g/dL    Albumin 4.20 3.50 - 5.00 g/dL    Globulin 2.7 gm/dL    A/G Ratio 1.6 1.0 - 2.0 g/dL    Total Bilirubin 0.5 0.2 - 1.3 mg/dL    Alkaline Phosphatase 114 38 - 126 U/L    AST (SGOT) 28 15 - 46 U/L    ALT (SGPT) 42 13 - 69 U/L   Vitamin B12   Result Value Ref Range    Vitamin B-12 320 239 - 931 pg/mL   Lipid Panel   Result Value Ref Range    Total Cholesterol 182 0 - 199 mg/dL    Triglycerides 121 <150 mg/dL    HDL Cholesterol 76 (H) 40 - 60 mg/dL    VLDL Cholesterol 24.2 mg/dL    LDL Cholesterol  82 0 - 99 mg/dL   Vitamin D 25 Hydroxy   Result Value Ref Range    25 Hydroxy, Vitamin D 25.3 ng/ml   CBC & Differential   Result Value Ref Range    WBC 5.38 4.80 - 10.80 10*3/mm3    RBC 4.84 4.20 - 5.40 10*6/mm3    Hemoglobin 14.3 12.0 - 16.0 g/dL    Hematocrit 43.4 37.0 - 47.0 %    MCV 89.7 81.0 - 99.0 fL    MCH 29.5 27.0 - 31.0 pg    MCHC 32.9 30.0 - 37.0 g/dL    RDW 12.3 11.5 - 14.5 %    Platelets 280 130 - 400 10*3/mm3    Neutrophil Rel % 51.7 37.0 - 80.0 %    Lymphocyte Rel % 31.8 10.0 - 50.0 %    Monocyte Rel % 7.6 0.0 - 12.0 %    Eosinophil Rel % 7.8 (H) 0.0 - 7.0 %    Basophil Rel % 0.9 0.0 - 2.5 %    Neutrophils Absolute 2.78 2.00 - 6.90 10*3/mm3    Lymphocytes Absolute 1.71 0.60 - 3.40 10*3/mm3    Monocytes Absolute 0.41 0.00 - 0.90 10*3/mm3    Eosinophils Absolute 0.42 0.00 - 0.70 10*3/mm3    Basophils Absolute 0.05 0.00 - 0.20 10*3/mm3    Immature Granulocyte Rel % 0.2 0.0 - 0.6 %    Immature Grans Absolute 0.01 0.00 - 0.06 10*3/mm3    nRBC 0.0 0.0 - 0.0 /100 WBC         Assessment/Plan   Diet and exercise discussed.      Wearing seat belts.        Caity was seen today for hypothyroidism.    Diagnoses and all orders for this visit:    Vitamin D deficiency  -     Vitamin B12  -     CBC & Differential  -     Lipid Panel  -     Comprehensive Metabolic Panel  -     T4, Free  -     TSH  -     Vitamin D  25 Hydroxy    Acquired hypothyroidism  -     levothyroxine (SYNTHROID, LEVOTHROID) 125 MCG tablet; Take 1 tablet by mouth Daily.  -     Vitamin B12  -     CBC & Differential  -     Lipid Panel  -     Comprehensive Metabolic Panel  -     T4, Free  -     TSH  -     Vitamin D 25 Hydroxy    Routine general medical examination at a health care facility  -     Cologuard - Stool, Per Rectum; Future  -     Ambulatory Referral to Gynecology    Colon cancer screening  -     Cologuard - Stool, Per Rectum; Future    Breast cancer screening  -     Mammo Screening Digital Tomosynthesis Bilateral With CAD      Return in about 6 months (around 11/22/2020).          There are no Patient Instructions on file for this visit.     Artem Rodriguez MD    Assessment/Plan

## 2020-05-26 ENCOUNTER — HOSPITAL ENCOUNTER (OUTPATIENT)
Dept: MAMMOGRAPHY | Facility: HOSPITAL | Age: 55
Discharge: HOME | End: 2020-05-26
Admitting: INTERNAL MEDICINE

## 2020-05-26 PROCEDURE — 77067 SCR MAMMO BI INCL CAD: CPT

## 2020-05-26 PROCEDURE — 77063 BREAST TOMOSYNTHESIS BI: CPT

## 2021-05-24 ENCOUNTER — TELEPHONE (OUTPATIENT)
Dept: INTERNAL MEDICINE | Facility: CLINIC | Age: 56
End: 2021-05-24

## 2021-05-24 NOTE — TELEPHONE ENCOUNTER
Patient did not complete cologuard ordered on 05/22/2020. This order is too old to be used. May I cancel the order?

## 2021-06-21 DIAGNOSIS — E03.9 ACQUIRED HYPOTHYROIDISM: ICD-10-CM

## 2021-06-21 RX ORDER — LEVOTHYROXINE SODIUM 0.12 MG/1
TABLET ORAL
Qty: 30 TABLET | Refills: 35 | OUTPATIENT
Start: 2021-06-21

## 2021-06-21 NOTE — TELEPHONE ENCOUNTER
Called pt, pt states she is currently not living in [place] and is seeing a new doctor where she lives and will no longer need her prescription refilled here.